# Patient Record
Sex: MALE | Race: WHITE | NOT HISPANIC OR LATINO | Employment: PART TIME | URBAN - METROPOLITAN AREA
[De-identification: names, ages, dates, MRNs, and addresses within clinical notes are randomized per-mention and may not be internally consistent; named-entity substitution may affect disease eponyms.]

---

## 2017-05-10 DIAGNOSIS — G43.909 MIGRAINE WITHOUT STATUS MIGRAINOSUS, NOT INTRACTABLE, UNSPECIFIED MIGRAINE TYPE: ICD-10-CM

## 2017-05-10 RX ORDER — PROPRANOLOL HYDROCHLORIDE 20 MG/1
TABLET ORAL
Qty: 60 TABLET | Refills: 0 | Status: SHIPPED | OUTPATIENT
Start: 2017-05-10 | End: 2019-12-19

## 2017-05-18 ENCOUNTER — PATIENT OUTREACH (OUTPATIENT)
Dept: ADMINISTRATIVE | Facility: HOSPITAL | Age: 37
End: 2017-05-18

## 2017-06-20 ENCOUNTER — PATIENT OUTREACH (OUTPATIENT)
Dept: ADMINISTRATIVE | Facility: HOSPITAL | Age: 37
End: 2017-06-20

## 2017-06-20 NOTE — PROGRESS NOTES
I have attempted without success to contact this patient by phone to schedule annual exam. Patient not available, mailbox not available.

## 2017-09-25 ENCOUNTER — TELEPHONE (OUTPATIENT)
Dept: INTERNAL MEDICINE | Facility: CLINIC | Age: 37
End: 2017-09-25

## 2017-09-25 NOTE — TELEPHONE ENCOUNTER
----- Message from Marycruz JAY Amlillianzina sent at 9/25/2017  3:41 PM CDT -----  Pt requesting copy of vaccination record. Will pickup on Thursday afternoon. Call pt if any questions. [phone]..

## 2019-12-19 ENCOUNTER — OFFICE VISIT (OUTPATIENT)
Dept: FAMILY MEDICINE | Facility: CLINIC | Age: 39
End: 2019-12-19
Payer: MEDICAID

## 2019-12-19 VITALS
WEIGHT: 192.69 LBS | RESPIRATION RATE: 18 BRPM | TEMPERATURE: 99 F | OXYGEN SATURATION: 97 % | DIASTOLIC BLOOD PRESSURE: 84 MMHG | BODY MASS INDEX: 30.24 KG/M2 | HEIGHT: 67 IN | HEART RATE: 86 BPM | SYSTOLIC BLOOD PRESSURE: 122 MMHG

## 2019-12-19 DIAGNOSIS — Z23 NEED FOR IMMUNIZATION AGAINST INFLUENZA: Primary | ICD-10-CM

## 2019-12-19 DIAGNOSIS — E55.9 VITAMIN D DEFICIENCY: ICD-10-CM

## 2019-12-19 DIAGNOSIS — G43.109 MIGRAINE WITH AURA AND WITHOUT STATUS MIGRAINOSUS, NOT INTRACTABLE: ICD-10-CM

## 2019-12-19 DIAGNOSIS — G47.33 OSA (OBSTRUCTIVE SLEEP APNEA): ICD-10-CM

## 2019-12-19 PROBLEM — G43.909 MIGRAINES: Status: ACTIVE | Noted: 2019-12-19

## 2019-12-19 PROCEDURE — 99214 OFFICE O/P EST MOD 30 MIN: CPT | Mod: S$PBB,,, | Performed by: INTERNAL MEDICINE

## 2019-12-19 PROCEDURE — 99999 PR PBB SHADOW E&M-EST. PATIENT-LVL IV: CPT | Mod: PBBFAC,,, | Performed by: INTERNAL MEDICINE

## 2019-12-19 PROCEDURE — 99999 PR PBB SHADOW E&M-EST. PATIENT-LVL IV: ICD-10-PCS | Mod: PBBFAC,,, | Performed by: INTERNAL MEDICINE

## 2019-12-19 PROCEDURE — 99214 PR OFFICE/OUTPT VISIT, EST, LEVL IV, 30-39 MIN: ICD-10-PCS | Mod: S$PBB,,, | Performed by: INTERNAL MEDICINE

## 2019-12-19 PROCEDURE — 99214 OFFICE O/P EST MOD 30 MIN: CPT | Mod: PBBFAC,PN | Performed by: INTERNAL MEDICINE

## 2019-12-19 PROCEDURE — 90686 IIV4 VACC NO PRSV 0.5 ML IM: CPT | Mod: PBBFAC,PN

## 2019-12-19 RX ORDER — VERAPAMIL HYDROCHLORIDE 120 MG/1
120 TABLET, FILM COATED ORAL 3 TIMES DAILY
Qty: 90 TABLET | Refills: 11 | Status: SHIPPED | OUTPATIENT
Start: 2019-12-19 | End: 2020-09-01 | Stop reason: SDUPTHER

## 2019-12-19 RX ORDER — SUMATRIPTAN 50 MG/1
50 TABLET, FILM COATED ORAL DAILY PRN
Qty: 9 TABLET | Refills: 0 | Status: SHIPPED | OUTPATIENT
Start: 2019-12-19 | End: 2020-01-23

## 2019-12-19 NOTE — PROGRESS NOTES
[facility] Note    Chief Complaint      Chief Complaint   Patient presents with    Migraine     pt here for migraines/ pt has been dealing with this for years     Dizziness     pt states that he has been exeriencing dizziness      History of Present Illness      George Sams is a 39 y.o. male who presents today for migraine and dizziness.  Patient comes to appointment alone.    Problem List Items Addressed This Visit     GISELLA (obstructive sleep apnea)    Current Assessment & Plan     Not sleeping on CPAP, did not bring it with him when he moved down here from [place].         Relevant Orders    CPAP FOR HOME USE    Migraine with aura and without status migrainosus, not intractable    Current Assessment & Plan     Feels them coming.  Gets them 3 times per week.  Sometimes relieved by 2 excedrin migraines.  Can last all day if not relieved by Excedrin.  No relief with topamax or propanolol in past.  Seen by neuro in past, thought to be related by stress.  Got one dose of Aimovig from previous PCP in [place], didn't think it helped. Takes 24 excedrin per week.  Has appt in late January 2020 with neurologist Dr. Niurka Garcia.  Has not tried botox in past.         Relevant Medications    verapamil (CALAN) 120 MG tablet    Vitamin D deficiency    Current Assessment & Plan     Took ergo for 4 months, took OTC afterward. Stopped several months ago.           Other Visit Diagnoses     Need for immunization against influenza    -  Primary    Relevant Orders    Influenza - Quadrivalent (PF)          Health Maintenance   Topic Date Due    Lipid Panel  1979    TETANUS VACCINE  05/31/1997    Hepatitis C Screening  Completed       Past Medical History:   Diagnosis Date    Anxiety     Migraines 2006       Past Surgical History:   Procedure Laterality Date    NASAL RECONSTRUCTION         family history includes Cancer (age of onset: 24) in his mother; Heart disease in his maternal  grandmother and paternal grandmother.     Social History     Tobacco Use    Smoking status: Former Smoker     Packs/day: 1.00     Years: 17.00     Pack years: 17.00     Last attempt to quit: 2013     Years since quittin.9   Substance Use Topics    Alcohol use: Yes     Alcohol/week: 2.0 standard drinks     Types: 2 Cans of beer per week     Frequency: Monthly or less     Drinks per session: 1 or 2     Binge frequency: Never    Drug use: No       Review of Systems   Constitutional: Negative for chills and fever.   HENT: Negative for congestion, hearing loss and sore throat.    Eyes: Negative for blurred vision and discharge.   Respiratory: Negative for cough, shortness of breath and wheezing.    Cardiovascular: Negative for chest pain and palpitations.   Gastrointestinal: Positive for diarrhea. Negative for blood in stool, constipation, nausea and vomiting.   Genitourinary: Negative for dysuria, hematuria and urgency.   Musculoskeletal: Positive for neck pain. Negative for falls and myalgias.   Skin: Negative for itching and rash.   Neurological: Positive for headaches. Negative for dizziness and weakness.   Endo/Heme/Allergies: Negative for polydipsia.        Outpatient Encounter Medications as of 2019   Medication Sig Dispense Refill    citalopram (CELEXA) 20 MG tablet Take 1 tablet (20 mg total) by mouth once daily. 30 tablet 11    sumatriptan (IMITREX) 50 MG tablet Take 1 tablet (50 mg total) by mouth daily as needed for Migraine. 9 tablet 0    verapamil (CALAN) 120 MG tablet Take 1 tablet (120 mg total) by mouth 3 (three) times daily. 90 tablet 11    [DISCONTINUED] propranolol (INDERAL) 20 MG tablet TAKE 1 TABLET(20 MG) BY MOUTH TWICE DAILY 60 tablet 0     No facility-administered encounter medications on file as of 2019.        Review of patient's allergies indicates:   Allergen Reactions    Penicillins Rash       Physical Exam      Vital Signs  Temp: 98.6 °F (37 °C)  Temp src:  "Oral  Pulse: 86  Resp: 18  SpO2: 97 %  BP: 122/84  BP Location: Right arm  Patient Position: Sitting  Pain Score: 0-No pain  Height and Weight  Height: 5' 7" (170.2 cm)  Weight: 87.4 kg (192 lb 10.9 oz)  BSA (Calculated - sq m): 2.03 sq meters  BMI (Calculated): 30.2  Weight in (lb) to have BMI = 25: 159.3]    Physical Exam   Constitutional: He is oriented to person, place, and time. He appears well-developed and well-nourished.   HENT:   Head: Normocephalic and atraumatic.   Right Ear: External ear normal.   Left Ear: External ear normal.   Eyes: Right eye exhibits no discharge. Left eye exhibits no discharge.   Neck: Normal range of motion. No thyromegaly present.   Cardiovascular: Normal rate, regular rhythm and intact distal pulses.   No murmur heard.  Pulmonary/Chest: Effort normal and breath sounds normal. No respiratory distress.   Abdominal: Soft. Bowel sounds are normal. He exhibits no distension. There is no tenderness.   Musculoskeletal: Normal range of motion. He exhibits no deformity.   Neurological: He is alert and oriented to person, place, and time.   Skin: Skin is warm and dry. No rash noted.   Psychiatric: He has a normal mood and affect. His behavior is normal.        Laboratory:  CBC:  No results for input(s): WBC, RBC, HGB, HCT, PLT, MCV, MCH, MCHC in the last 2160 hours.  CMP:  No results for input(s): GLU, CALCIUM, ALBUMIN, PROT, NA, K, CO2, CL, BUN, ALKPHOS, ALT, AST, BILITOT in the last 2160 hours.    Invalid input(s): CREATININ  URINALYSIS:  No results for input(s): COLORU, CLARITYU, SPECGRAV, PHUR, PROTEINUA, GLUCOSEU, BILIRUBINCON, BLOODU, WBCU, RBCU, BACTERIA, MUCUS, NITRITE, LEUKOCYTESUR, UROBILINOGEN, HYALINECASTS in the last 2160 hours.   LIPIDS:  No results for input(s): TSH, HDL, CHOL, TRIG, LDLCALC, CHOLHDL, NONHDLCHOL, TOTALCHOLEST in the last 2160 hours.  TSH:  No results for input(s): TSH in the last 2160 hours.  A1C:  No results for input(s): HGBA1C in the last 2160 " hours.    Radiology:  No imaging on file    Assessment/Plan     George Sams is a 39 y.o.male with:    1. Migraine with aura and without status migrainosus, not intractable  - verapamil (CALAN) 120 MG tablet; Take 1 tablet (120 mg total) by mouth 3 (three) times daily.  Dispense: 90 tablet; Refill: 11    2. Vitamin D deficiency    3. GISELLA (obstructive sleep apnea)  - CPAP FOR HOME USE    4. Need for immunization against influenza  - Influenza - Quadrivalent (PF)    -flu shot today  -Counseled on cutting down on Excedrin  -patient will call to let me know who his PCP in [place] was so I can get records  -reorder CPAP machine and supplies, could be contributing to his symptoms  -continue follow up with neuro next month.  Start Verapamil, has failed topamax, aimovig and propanolol.  Will give imitrex PRN, has worked for him in past.  -Continue current medications and maintain follow up with specialists.  Return to clinic PRN       Alissa Cortes MD  [facility]      Answers for HPI/ROS submitted by the patient on 12/18/2019   activity change: No  unexpected weight change: No  rhinorrhea: No  trouble swallowing: No  visual disturbance: No  chest tightness: No  polyuria: No  difficulty urinating: No  joint swelling: No  arthralgias: No  confusion: No  dysphoric mood: No

## 2019-12-19 NOTE — ASSESSMENT & PLAN NOTE
Feels them coming.  Gets them 3 times per week.  Sometimes relieved by 2 excedrin migraines.  Can last all day if not relieved by Excedrin.  No relief with topamax or propanolol in past.  Seen by neuro in past, thought to be related by stress.  Got one dose of Aimovig from previous PCP in [place], didn't think it helped. Takes 24 excedrin per week.  Has appt in late January 2020 with neurologist Dr. Niurka Garcia.  Has not tried botox in past.

## 2020-01-23 RX ORDER — SUMATRIPTAN 50 MG/1
50 TABLET, FILM COATED ORAL DAILY PRN
Qty: 9 TABLET | Refills: 0 | Status: SHIPPED | OUTPATIENT
Start: 2020-01-23 | End: 2022-12-13 | Stop reason: SDUPTHER

## 2020-04-24 DIAGNOSIS — F41.9 ANXIETY: ICD-10-CM

## 2020-04-24 RX ORDER — CITALOPRAM 20 MG/1
20 TABLET, FILM COATED ORAL DAILY
Qty: 30 TABLET | Refills: 11 | Status: SHIPPED | OUTPATIENT
Start: 2020-04-24 | End: 2021-05-20

## 2020-05-26 ENCOUNTER — TELEPHONE (OUTPATIENT)
Dept: FAMILY MEDICINE | Facility: CLINIC | Age: 40
End: 2020-05-26

## 2020-05-26 NOTE — TELEPHONE ENCOUNTER
----- Message from Alissa Cortes MD sent at 5/26/2020 11:43 AM CDT -----  Regarding: FW: Patient Update  Contact: [phone]  Can someone please call patient about this?    ----- Message -----  From: Shivani Phelps, CRT  Sent: 5/26/2020  11:32 AM CDT  To: Alissa Cortes MD  Subject: Patient Update                                   Good afternoon. I just wanted to update you on the status of Mr. Sams's new CPAP machine. We've tried several attempts to schedule him to come in for his new CPAP, but he has not committed. If possible, can someone from your office reach out to him to see if he still wants to move forward with getting his new CPAP. Thanks and have a great day.

## 2020-09-01 DIAGNOSIS — G43.109 MIGRAINE WITH AURA AND WITHOUT STATUS MIGRAINOSUS, NOT INTRACTABLE: ICD-10-CM

## 2020-09-01 RX ORDER — VERAPAMIL HYDROCHLORIDE 120 MG/1
120 TABLET, FILM COATED ORAL 3 TIMES DAILY
Qty: 21 TABLET | Refills: 0 | Status: SHIPPED | OUTPATIENT
Start: 2020-09-01 | End: 2020-12-21 | Stop reason: SDUPTHER

## 2020-09-01 NOTE — TELEPHONE ENCOUNTER
Patient is on vacation in [place] and left medication at home is requesting a 7 day refill sent to the [facility] in [place]

## 2020-09-01 NOTE — TELEPHONE ENCOUNTER
----- Message from Margot Yin sent at 9/1/2020 12:04 PM CDT -----  Contact: Self   Pt states he left is rx at home and he is currently on vacation. Pt would like to know if possible to send rx to him. Please advise

## 2021-01-04 ENCOUNTER — PATIENT MESSAGE (OUTPATIENT)
Dept: ADMINISTRATIVE | Facility: HOSPITAL | Age: 41
End: 2021-01-04

## 2021-04-05 ENCOUNTER — PATIENT MESSAGE (OUTPATIENT)
Dept: ADMINISTRATIVE | Facility: HOSPITAL | Age: 41
End: 2021-04-05

## 2021-04-28 ENCOUNTER — PATIENT MESSAGE (OUTPATIENT)
Dept: RESEARCH | Facility: HOSPITAL | Age: 41
End: 2021-04-28

## 2021-05-20 ENCOUNTER — TELEPHONE (OUTPATIENT)
Dept: FAMILY MEDICINE | Facility: CLINIC | Age: 41
End: 2021-05-20

## 2021-05-20 DIAGNOSIS — G43.109 MIGRAINE WITH AURA AND WITHOUT STATUS MIGRAINOSUS, NOT INTRACTABLE: ICD-10-CM

## 2021-05-20 RX ORDER — VERAPAMIL HYDROCHLORIDE 120 MG/1
TABLET, FILM COATED ORAL
Qty: 90 TABLET | Refills: 0 | Status: SHIPPED | OUTPATIENT
Start: 2021-05-20 | End: 2022-09-01 | Stop reason: SDUPTHER

## 2022-05-26 DIAGNOSIS — F41.9 ANXIETY: ICD-10-CM

## 2022-05-26 RX ORDER — CITALOPRAM 20 MG/1
20 TABLET, FILM COATED ORAL DAILY
Qty: 30 TABLET | Refills: 11 | OUTPATIENT
Start: 2022-05-26

## 2022-11-01 PROBLEM — L71.9 ROSACEA: Status: ACTIVE | Noted: 2022-11-01

## 2022-11-01 PROBLEM — R10.84 GENERALIZED ABDOMINAL PAIN: Status: ACTIVE | Noted: 2022-11-01

## 2022-11-01 PROBLEM — G43.709 CHRONIC MIGRAINE WITHOUT AURA WITHOUT STATUS MIGRAINOSUS, NOT INTRACTABLE: Status: ACTIVE | Noted: 2022-11-01

## 2022-11-29 PROBLEM — G43.719 INTRACTABLE CHRONIC MIGRAINE WITHOUT AURA AND WITHOUT STATUS MIGRAINOSUS: Status: ACTIVE | Noted: 2022-11-01

## 2023-03-04 PROBLEM — G43.709 CHRONIC MIGRAINE WITHOUT AURA WITHOUT STATUS MIGRAINOSUS, NOT INTRACTABLE: Status: ACTIVE | Noted: 2023-03-04

## 2023-03-15 PROBLEM — D75.1 ERYTHROCYTOSIS: Status: ACTIVE | Noted: 2023-03-15

## 2023-05-17 PROBLEM — K21.9 GASTROESOPHAGEAL REFLUX DISEASE: Status: ACTIVE | Noted: 2023-05-17

## 2024-01-31 PROBLEM — E78.2 MIXED HYPERLIPIDEMIA: Status: ACTIVE | Noted: 2024-01-31

## 2024-02-01 PROBLEM — A51.5 EARLY SYPHILIS, LATENT: Status: ACTIVE | Noted: 2024-02-01

## 2024-06-10 ENCOUNTER — PATIENT OUTREACH (OUTPATIENT)
Dept: ADMINISTRATIVE | Facility: HOSPITAL | Age: 44
End: 2024-06-10
Payer: MEDICAID

## 2024-06-10 DIAGNOSIS — Z12.12 SCREENING FOR COLORECTAL CANCER: Primary | ICD-10-CM

## 2024-06-10 DIAGNOSIS — Z12.11 SCREENING FOR COLORECTAL CANCER: Primary | ICD-10-CM

## 2024-07-19 PROBLEM — K02.9 DENTAL CARIES: Status: ACTIVE | Noted: 2024-07-19

## 2024-09-16 PROBLEM — M79.631 PAIN OF RIGHT FOREARM: Status: ACTIVE | Noted: 2024-09-16

## 2024-10-24 ENCOUNTER — PATIENT OUTREACH (OUTPATIENT)
Dept: ADMINISTRATIVE | Facility: HOSPITAL | Age: 44
End: 2024-10-24
Payer: MEDICAID

## 2024-11-13 ENCOUNTER — PATIENT MESSAGE (OUTPATIENT)
Dept: RESEARCH | Facility: HOSPITAL | Age: 44
End: 2024-11-13
Payer: MEDICAID

## 2025-03-19 ENCOUNTER — PATIENT OUTREACH (OUTPATIENT)
Dept: ADMINISTRATIVE | Facility: HOSPITAL | Age: 45
End: 2025-03-19
Payer: MEDICAID

## 2025-04-30 ENCOUNTER — PATIENT OUTREACH (OUTPATIENT)
Dept: ADMINISTRATIVE | Facility: HOSPITAL | Age: 45
End: 2025-04-30
Payer: MEDICAID

## 2025-05-13 ENCOUNTER — PATIENT OUTREACH (OUTPATIENT)
Dept: ADMINISTRATIVE | Facility: HOSPITAL | Age: 45
End: 2025-05-13
Payer: MEDICAID